# Patient Record
Sex: MALE | Employment: UNEMPLOYED | URBAN - METROPOLITAN AREA
[De-identification: names, ages, dates, MRNs, and addresses within clinical notes are randomized per-mention and may not be internally consistent; named-entity substitution may affect disease eponyms.]

---

## 2019-10-07 ENCOUNTER — HOSPITAL ENCOUNTER (EMERGENCY)
Facility: CLINIC | Age: 24
Discharge: HOME | End: 2019-10-07
Attending: EMERGENCY MEDICINE | Admitting: EMERGENCY MEDICINE

## 2019-10-07 VITALS
TEMPERATURE: 97.9 F | SYSTOLIC BLOOD PRESSURE: 118 MMHG | RESPIRATION RATE: 18 BRPM | HEIGHT: 68 IN | OXYGEN SATURATION: 100 % | BODY MASS INDEX: 23.49 KG/M2 | WEIGHT: 155 LBS | DIASTOLIC BLOOD PRESSURE: 79 MMHG

## 2019-10-07 DIAGNOSIS — S50.812A FOREARM ABRASION, INFECTED, LEFT, INITIAL ENCOUNTER: ICD-10-CM

## 2019-10-07 DIAGNOSIS — L02.91 ABSCESS: ICD-10-CM

## 2019-10-07 DIAGNOSIS — L08.9 FOREARM ABRASION, INFECTED, LEFT, INITIAL ENCOUNTER: ICD-10-CM

## 2019-10-07 PROCEDURE — 87070 CULTURE OTHR SPECIMN AEROBIC: CPT | Performed by: EMERGENCY MEDICINE

## 2019-10-07 PROCEDURE — 10060 I&D ABSCESS SIMPLE/SINGLE: CPT

## 2019-10-07 PROCEDURE — 25000125 ZZHC RX 250

## 2019-10-07 PROCEDURE — 87077 CULTURE AEROBIC IDENTIFY: CPT | Performed by: EMERGENCY MEDICINE

## 2019-10-07 PROCEDURE — 99283 EMERGENCY DEPT VISIT LOW MDM: CPT | Mod: 25

## 2019-10-07 PROCEDURE — 25000132 ZZH RX MED GY IP 250 OP 250 PS 637: Performed by: EMERGENCY MEDICINE

## 2019-10-07 PROCEDURE — 87186 SC STD MICRODIL/AGAR DIL: CPT | Performed by: EMERGENCY MEDICINE

## 2019-10-07 RX ORDER — CLINDAMYCIN HCL 300 MG
300 CAPSULE ORAL 4 TIMES DAILY
Qty: 40 CAPSULE | Refills: 0 | Status: SHIPPED | OUTPATIENT
Start: 2019-10-07 | End: 2019-10-09

## 2019-10-07 RX ORDER — LIDOCAINE HYDROCHLORIDE AND EPINEPHRINE 10; 10 MG/ML; UG/ML
INJECTION, SOLUTION INFILTRATION; PERINEURAL
Status: COMPLETED
Start: 2019-10-07 | End: 2019-10-07

## 2019-10-07 RX ORDER — CLINDAMYCIN HCL 150 MG
300 CAPSULE ORAL ONCE
Status: COMPLETED | OUTPATIENT
Start: 2019-10-07 | End: 2019-10-07

## 2019-10-07 RX ADMIN — CLINDAMYCIN HYDROCHLORIDE 300 MG: 150 CAPSULE ORAL at 12:01

## 2019-10-07 RX ADMIN — LIDOCAINE HYDROCHLORIDE,EPINEPHRINE BITARTRATE 1 ML: 10; .01 INJECTION, SOLUTION INFILTRATION; PERINEURAL at 11:56

## 2019-10-07 ASSESSMENT — MIFFLIN-ST. JEOR: SCORE: 1672.58

## 2019-10-07 ASSESSMENT — ENCOUNTER SYMPTOMS
WOUND: 1
COLOR CHANGE: 1

## 2019-10-07 NOTE — DISCHARGE INSTRUCTIONS
Your skin shows signs of infection with abscess. We did send a wound culture and will call you if you need new antibiotics. Packing needs to be removed if still present in 72 hours, through ER for reassessment. Return to ER with fever greater than 100.4 or rapid increase in redness on antibiotics

## 2019-10-07 NOTE — ED PROVIDER NOTES
"  History     Chief Complaint:  Wound Check     HPI   Sung Kirby is a 24 year old male who presents with wound check. The patient reports that last week he had slipped down the stairs and he believes that he got a wood splinter stuck in his right posterior forearm. Since then, he has had worsening redness, swelling, and pain to the area. The patient was unable to sleep from the pain the past two night and today he has throbbing shooting pain up into his shoulder. He denies IV drug use.     Allergies:  No known drug allergies.       Medications:    No current medications.     Past Medical History:    Medical history reviewed. No pertinent medical history.      Past Surgical History:    Past surgical history reviewed. No pertinent past surgical history.     Family History:    Family history reviewed. No pertinent family history.     Social History:  The patient was accompanied to the ED by friend.  Smoking Status: Never Smoker  Smokeless Tobacco: Never Used  Drug Use: Negative    Review of Systems   Musculoskeletal:        Right posterior forearm swelling and pain   Skin: Positive for color change (redness) and wound.   All other systems reviewed and are negative.    Physical Exam     Patient Vitals for the past 24 hrs:   BP Temp Temp src Heart Rate Resp SpO2 Height Weight   10/07/19 1107 119/79 97.9  F (36.6  C) Oral 113 18 99 % 1.727 m (5' 8\") 70.3 kg (155 lb)      Physical Exam  Vitals signs reviewed.   Cardiovascular:      Rate and Rhythm: Normal rate.   Pulmonary:      Effort: Pulmonary effort is normal.   Musculoskeletal: Normal range of motion.   Skin:     Comments: Behind the right elbow there is a area of induration and redness that extends around the olecranon fossa.  There is a purplish wound that is fluctuant.  Without drainage.  There is no obvious external foreign body.  There is no epitrochlear adenopathy and no streaking up the arm.   Neurological:      Mental Status: He is alert. "           Emergency Department Course     Procedures:    Procedure: Incision and Drainage   Performed by: Jacob Dickerson MD    LOCATION:  Right posterior forearm       ANESTHESIA:  Local field block using 1% Lidocaine with Epinephrine, total of 7 mLs    PREPARATION:  Cleansed with Betadine    PROCEDURE:  Area was incised with # 11 Blade (Sharp Point) with a Single Straight incision.  Wound treatment included Deloculation, Purulent Drainage and Expression of Material.  Packing consisted of 1/4 inch Plain Gauze.  Appropriate dressing was applied to cover the area.    Patient Status:  Patient tolerated the procedure moderately well. There were no complications evident    Interventions:  300 mg Clindamycin PO     Emergency Department Course:    1121 Nursing notes and vitals reviewed. I performed an exam of the patient as documented above.     1125 Procedure started.     1145 Prior to discharge, I personally reviewed the results with the patient and all related questions were answered. The patient verbalized understanding and is amenable to plan.     Impression & Plan      Medical Decision Making:  Sung Kirby is a 24 year old male who presents to the emergency department today for evaluation of right elbow pain.  There is a history of a possible foreign body in this wound wound was explored after a T-shaped incision was made and no foreign body was identified there were large amount of purulent pus was drained.  Patient is recommended clindamycin for antibiotics patient was afraid of needles and oral antibiotics were initiated in the emergency room.  Due to initiation of antibiotics and and purulent drainage wound culture was sent patient is asked to return for packing removal and reassessment of the wound.    Diagnosis:    ICD-10-CM    1. Forearm abrasion, infected, left, initial encounter S50.812A     L08.9    2. Abscess L02.91      Disposition:   The patient is discharged to home.     Discharge  Medications:  New Prescriptions    CLINDAMYCIN (CLEOCIN) 300 MG CAPSULE    Take 1 capsule (300 mg) by mouth 4 times daily for 10 days     Scribe Disclosure:  I, Orla Severson, am serving as a scribe at 11:26 AM on 10/7/2019 to document services personally performed by Jacob Dickerson MD based on my observations and the provider's statements to me.   [facility] EMERGENCY DEPARTMENT       Jacob Dickerson MD  10/08/19 0913

## 2019-10-07 NOTE — ED TRIAGE NOTES
sliver in right forearm since last Wednesday.  Area red.swelling since then.  Patient alert and oriented x3.  Airway, breathing and circulation intact.

## 2019-10-07 NOTE — ED AVS SNAPSHOT
[facility] Emergency Department  [address]  [facility] [address]  Phone:  [phone]  Fax:  [phone]                                    Sung Kirby   MRN: [id number]    Department:  [facility] Emergency Department   Date of Visit:  10/7/2019           After Visit Summary Signature Page    I have received my discharge instructions, and my questions have been answered. I have discussed any challenges I see with this plan with the nurse or doctor.    ..........................................................................................................................................  Patient/Patient Representative Signature      ..........................................................................................................................................  Patient Representative Print Name and Relationship to Patient    ..................................................               ................................................  Date                                   Time    ..........................................................................................................................................  Reviewed by Signature/Title    ...................................................              ..............................................  Date                                               Time          22EPIC Rev 08/18

## 2019-10-09 ENCOUNTER — TELEPHONE (OUTPATIENT)
Dept: EMERGENCY MEDICINE | Facility: CLINIC | Age: 24
End: 2019-10-09

## 2019-10-09 DIAGNOSIS — A49.02 MRSA INFECTION: ICD-10-CM

## 2019-10-09 LAB
BACTERIA SPEC CULT: ABNORMAL
Lab: ABNORMAL
SPECIMEN SOURCE: ABNORMAL

## 2019-10-09 RX ORDER — CLINDAMYCIN HCL 300 MG
300 CAPSULE ORAL 4 TIMES DAILY
Qty: 40 CAPSULE | Refills: 0 | Status: SHIPPED | OUTPATIENT
Start: 2019-10-09 | End: 2019-10-19

## 2019-10-09 NOTE — RESULT ENCOUNTER NOTE
Final Wound culture (Forearm abrasion) report on 10/9/19  Emergency Dept discharge antibiotic prescribed: Clindamycin (Cleocin) 300 mg PO capsule, 1 capsule (300 mg) by mouth 4 times daily for 10 days  #1. Bacteria, Heavy growth Methicillin resistant Staphylococcus aureus, which is [SUSCEPTIBLE] to antibiotic   Incision and Drainage performed in [place] ED [Yes / No]: yes  Patient to be notified of result, symptoms assessed and advised per [place] ED lab result protocol.

## 2019-10-09 NOTE — TELEPHONE ENCOUNTER
"2:22PM: Patient calling back. States that he misunderstood and has not picked up any antibiotic from the pharmacy. \"I got an antibiotic in the ER.\"   Advised Patient that he should have a written prescription with his ED paperwork.   Patient states he is at work and is unable to access his paperwork to look if a prescription is there.   Advised Patient that I would send in his medication to a pharmacy now, patient requests the Clindamycin be sent to the [facility] pharmacy in [place]. Medication sent.   Patient states that he will follow up at the ED as directed tomorrow around 5pm.    Patient is comfortable with the plan of care and has no further questions.     Jing Cardoza RN  [facility] RN  Lung Nodule and ED Lab Result RN  Epic pool (ED late result f/u RN): P [id number]  [facility] INCIDENTAL RADIOLOGY F/U NURSES: P [id number]  # [phone]    "

## 2025-07-21 NOTE — TELEPHONE ENCOUNTER
[facility] Emergency Department Lab result notification:    [place] ED lab result protocol used  General culture    Reason for call  Notify of lab results, assess symptoms,  review ED providers recommendations/discharge instructions (if necessary) and advise per ED lab result f/u protocol    Lab Result   Final Wound culture (Forearm abrasion) report on 10/9/19  Emergency Dept discharge antibiotic prescribed: Clindamycin (Cleocin) 300 mg PO capsule, 1 capsule (300 mg) by mouth 4 times daily for 10 days  #1. Bacteria, Heavy growth Methicillin resistant Staphylococcus aureus, which is [SUSCEPTIBLE] to antibiotic   Incision and Drainage performed in [place] ED [Yes / No]: yes  Patient to be notified of result, symptoms assessed and advised per [place] [facility] ED lab result protocol.  Information table from ED Provider visit on 10/7/19  Symptoms reported at ED visit (Chief complaint, HPI) Wound Check      HPI   Sung Kirby is a 24 year old male who presents with wound check. The patient reports that last week he had slipped down the stairs and he believes that he got a wood splinter stuck in his right posterior forearm. Since then, he has had worsening redness, swelling, and pain to the area. The patient was unable to sleep from the pain the past two night and today he has throbbing shooting pain up into his shoulder. He denies IV drug use.    ED providers Impression and Plan (applicable information) Sung Kirby is a 24 year old male who presents to the emergency department today for evaluation of right elbow pain.  There is a history of a possible foreign body in this wound wound was explored after a T-shaped incision was made and no foreign body was identified there were large amount of purulent pus was drained.  Patient is recommended clindamycin for antibiotics patient was afraid of needles and oral antibiotics were initiated in the emergency room.  Due to initiation of antibiotics and and  Please offer this patient appt Sep 23 at 8:15 am or 10:45 am. She has appt in Oct but wanted to be seen earlier.    "purulent drainage wound culture was sent patient is asked to return for packing removal and reassessment of the wound.   Miscellaneous information na     RN Assessment (Patient s current Symptoms), include time called.  [Insert Left message here if message left]  2;15PM: Spoke with Patient. He states that he is improving. \"I'm having no difficulty with my arm at all.\" Swelling is decreased. Denies any other concerns. States that the packing is out.   RN Recommendations/Instructions per [place] ED lab result protocol  Patient notified of lab result and treatment recommendation.   Advised to continue taking the antibiotic prescribed and to be seen again in the ED tomorrow as directed by the ED provider.  Reviewed MRSA information with patient per protocol and [facility] reference.  Patient is comfortable with the information given and has no further questions. States he will go to the ED tomorrow for follow up.     Please Contact your PCP clinic or return to the Emergency department if your:    Symptoms worsen or other concerning symptom's.    [RN Name]  Jing Cardoza RN  [facility] RN  Lung Nodule and ED Lab Result RN  Epic pool (ED late result f/u RN): [id number]  [facility] INCIDENTAL RADIOLOGY F/U NURSES: P [id number]  Ph# [phone]      Copy of Lab result   Wound Culture Aerobic Bacterial [[id number]] (Order [id number])   Order Requisition     Wound Culture Aerobic Bacterial (Order #[id number]) on 10/7/19   Exam Information     Exam Date Exam Time Accession # Results    10/7/19 11:54 AM     Component Results     Specimen Information: arm        Component Collected Lab   Specimen Description 10/07/2019 11:54    Arm Right FOREARM Wound    Special Requests 10/07/2019 11:54    Specimen collected in eSwab transport (white cap)    Culture Micro Abnormal  10/07/2019 11:54    Heavy growth   Methicillin resistant Staphylococcus aureus (MRSA)    Susceptibility     Methicillin resistant " staphylococcus aureus (mrsa)     Antibiotic Interpretation Sensitivity Method Status   CLINDAMYCIN Sensitive <=0.25 ug/mL RAFAL Final   ERYTHROMYCIN Sensitive 0.5 ug/mL RAFAL Final   GENTAMICIN Sensitive <=0.5 ug/mL RAFAL Final   LINEZOLID Sensitive 2 ug/mL RAFAL Final   OXACILLIN Resistant >=4 ug/mL RAFAL Final   TETRACYCLINE Sensitive <=1 ug/mL RAFAL Final   Trimethoprim/Sulfa Sensitive <=0.5/9.5 ug/mL RAFAL Final   VANCOMYCIN Sensitive 1 ug/mL RAFAL Final